# Patient Record
Sex: MALE | Race: OTHER | HISPANIC OR LATINO
[De-identification: names, ages, dates, MRNs, and addresses within clinical notes are randomized per-mention and may not be internally consistent; named-entity substitution may affect disease eponyms.]

---

## 2022-06-27 ENCOUNTER — APPOINTMENT (OUTPATIENT)
Dept: ORTHOPEDIC SURGERY | Facility: CLINIC | Age: 32
End: 2022-06-27
Payer: SELF-PAY

## 2022-06-27 VITALS — WEIGHT: 210 LBS | HEIGHT: 67 IN | BODY MASS INDEX: 32.96 KG/M2

## 2022-06-27 DIAGNOSIS — Z78.9 OTHER SPECIFIED HEALTH STATUS: ICD-10-CM

## 2022-06-27 DIAGNOSIS — M89.9 DISORDER OF BONE, UNSPECIFIED: ICD-10-CM

## 2022-06-27 DIAGNOSIS — M25.562 PAIN IN LEFT KNEE: ICD-10-CM

## 2022-06-27 PROBLEM — Z00.00 ENCOUNTER FOR PREVENTIVE HEALTH EXAMINATION: Status: ACTIVE | Noted: 2022-06-27

## 2022-06-27 PROCEDURE — 99204 OFFICE O/P NEW MOD 45 MIN: CPT

## 2022-06-27 PROCEDURE — 73564 X-RAY EXAM KNEE 4 OR MORE: CPT | Mod: LT

## 2022-06-27 NOTE — ASSESSMENT
[FreeTextEntry1] : 31 year M left knee medial condyle swelling 2x2 cm. MRI to r/o osseus lesion\par - follow up after MRI complete.\par

## 2022-06-27 NOTE — IMAGING
[de-identified] : Constitutional: well developed and well nourished, able to communicate\par Cardiovascular: Peripheral vascular exam is grossly normal\par Neurologic: Alert and oriented, no acute distress.\par Skin: normal skin with no ulcers, rashes, or lesions\par Pulmonary: No respiratory distress, breathing comfortably on room air\par Lymphatics: No obvious lymphadenopathy or lymphedema in areas examined\par \par LEFT KNEE EXAM\par Alignment: Neutral \par Effusion: None\par Atrophy: None                                                 \par Stable to Varus/valgus stress\par Posterior Drawer Test: negative\par Anterior Drawer Test: Negative\par Knee Extension/Flexion: 0 / 120\par \par Medial/lateral compartments\par Medial joint line: pos Tenderness\par Lateral joint line: No Tenderness\par Chas test: negative\par \par Patellofemoral joint\par Medial patellar facet: no tenderness\par Patellar grind: Negative\par 2x2cm area of swelling around the medial femoral condyle \par \par Tendons:\par Pes Anserine: No tenderness\par Gerdy’s Tubercle/ IT Band: No tenderness\par Quadriceps Tendon: No Tenderness\par patellar tendon: no Tenderness\par Tibial tubercle: not tenderness\par Calf: no Tenderness\par \par Neurovascular exam\par Muscle strength: 5/5\par Sensation to light touch: intact\par Distal pulses: 2+\par  \par IMAGING:\par 06/27/2022 Xrays of the left knee 4v were taken demonstrating PF OA.

## 2022-06-27 NOTE — HISTORY OF PRESENT ILLNESS
[Gradual] : gradual [7] : 7 [5] : 5 [Dull/Aching] : dull/aching [Intermittent] : intermittent [Rest] : rest [Standing] : standing [Exercising] : exercising [Stairs] : stairs [de-identified] : 06/27/2022: a 32 yo male, presents for initial evaluation of left knee pain noticed few years ago and has worsen in the past 2 years with increased intensity in the past two week. the pain is associated with swelling, clicking sound. no buckling/giving out/stiffness.\par \par PMH\par 20 years ago his left knee buckled cause dislocation of the knee, it was manipulated. 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